# Patient Record
Sex: MALE | NOT HISPANIC OR LATINO | Employment: FULL TIME | URBAN - METROPOLITAN AREA
[De-identification: names, ages, dates, MRNs, and addresses within clinical notes are randomized per-mention and may not be internally consistent; named-entity substitution may affect disease eponyms.]

---

## 2019-11-08 ENCOUNTER — OFFICE VISIT (OUTPATIENT)
Dept: UROLOGY | Facility: CLINIC | Age: 59
End: 2019-11-08
Payer: COMMERCIAL

## 2019-11-08 VITALS
RESPIRATION RATE: 16 BRPM | HEIGHT: 67 IN | BODY MASS INDEX: 34.53 KG/M2 | HEART RATE: 69 BPM | WEIGHT: 220 LBS | SYSTOLIC BLOOD PRESSURE: 102 MMHG | DIASTOLIC BLOOD PRESSURE: 70 MMHG

## 2019-11-08 DIAGNOSIS — N52.9 ERECTILE DYSFUNCTION, UNSPECIFIED ERECTILE DYSFUNCTION TYPE: ICD-10-CM

## 2019-11-08 DIAGNOSIS — R97.20 ELEVATED PSA: Primary | ICD-10-CM

## 2019-11-08 LAB
PROSTATE SPECIFIC ANTIGEN, TOTAL: 5.41 NG/ML (ref 0.1–4)
PSA FREE MFR SERPL: 11 %
PSA FREE SERPL-MCNC: 0.6 NG/ML

## 2019-11-08 PROCEDURE — 36415 COLL VENOUS BLD VENIPUNCTURE: CPT | Mod: S$GLB,,, | Performed by: NURSE PRACTITIONER

## 2019-11-08 PROCEDURE — 36415 PR COLLECTION VENOUS BLOOD,VENIPUNCTURE: ICD-10-PCS | Mod: S$GLB,,, | Performed by: NURSE PRACTITIONER

## 2019-11-08 PROCEDURE — 99204 PR OFFICE/OUTPT VISIT, NEW, LEVL IV, 45-59 MIN: ICD-10-PCS | Mod: 25,S$GLB,, | Performed by: NURSE PRACTITIONER

## 2019-11-08 PROCEDURE — 3008F BODY MASS INDEX DOCD: CPT | Mod: CPTII,S$GLB,, | Performed by: NURSE PRACTITIONER

## 2019-11-08 PROCEDURE — 99204 OFFICE O/P NEW MOD 45 MIN: CPT | Mod: 25,S$GLB,, | Performed by: NURSE PRACTITIONER

## 2019-11-08 PROCEDURE — 3008F PR BODY MASS INDEX (BMI) DOCUMENTED: ICD-10-PCS | Mod: CPTII,S$GLB,, | Performed by: NURSE PRACTITIONER

## 2019-11-08 RX ORDER — TADALAFIL 20 MG/1
TABLET, FILM COATED ORAL
Refills: 3 | COMMUNITY
Start: 2019-10-15

## 2019-11-08 RX ORDER — LISINOPRIL AND HYDROCHLOROTHIAZIDE 10; 12.5 MG/1; MG/1
TABLET ORAL
Refills: 11 | COMMUNITY
Start: 2019-10-15

## 2019-11-08 RX ORDER — ROSUVASTATIN CALCIUM 20 MG/1
TABLET, COATED ORAL
Refills: 0 | COMMUNITY
Start: 2019-09-16

## 2019-11-08 NOTE — PROGRESS NOTES
Chief Complaint: elevated PSA    HPI:  59-year-old  male who presents as a new referral for elevated PSA.  Patient underwent a routine work physical, PSA results showing elevation around 5, so patient was recommended to follow-up with urology.  Patient does not have a copy of these lab results today.  He denies a family history of prostate cancer.  He denies any difficulty with urination such as weak stream, urinary frequency, nocturia, hematuria, or burning with urination.  He is not on any prostate medications.  He denies any recent diagnosis of UTI or recent antibiotic use.     Patient does report a history of erectile dysfunction, maintained on Cialis prescribed by another provider.    Allergies:  Patient has no known allergies.    Medications:  has a current medication list which includes the following prescription(s): cialis, lisinopril-hydrochlorothiazide, and rosuvastatin.    Review of Systems:  General: No fever, chills, vision changes, dizziness, weakness, fatigue, unexplained weight loss, confusion, or mood swings.  Skin: No rashes, itching, or changes in color/texture of skin.  Chest: Denies BONILLA, cyanosis, wheezing, cough, and sputum production.  Abdomen: Appetite fine. Denies diarrhea, abdominal pain, hematemesis, or blood in stool.  Musculoskeletal: No joint stiffness or swelling. Denies back pain.  : As above.  All other review of systems negative.    PMH:   has a past medical history of ED (erectile dysfunction), HTN (hypertension), and Hyperlipemia.    PSH:   has a past surgical history that includes Knee arthroscopy and Rotator cuff repair.    FamHx: family history is not on file.    SocHx:  reports that he has never smoked. He has never used smokeless tobacco. He reports that he drinks alcohol. He reports that he does not use drugs.      Physical Exam:  Vitals:    11/08/19 0952   BP: 102/70   Pulse: 69   Resp: 16     General: AAOx3, no apparent distress, no deformities  Neck:  supple, no masses, normal thyroid  Lungs: normal inspiration, no use of accessory muscles  Heart: regular rate and rhythm, no arrhythmias  Abdomen: soft, NT, ND, no masses, no hernias, no hepatosplenomegaly  Lymphatic: no unusually enlarged or tender lymph nodes  Skin: warm and dry, no jaundice.  Ext: without edema or deformity.  : Normal rectal tone, no hemorrhoids. Prostate soft, smooth surface, no nodules or masses appreciated.   Labs/Studies: none    Impression/Plan:   Elevated PSA  Comments:  PSA from outside facility 5 so will redraw today and if elevated then we will proceed with biopsy  ADRIANA accomplished  Orders:  -     PSA, total and free    Erectile dysfunction, unspecified erectile dysfunction type  Comments:  maintained on Cialis prescribed by another provider        No follow-ups on file. Follow up to be determined after review of PSA results

## 2019-11-11 ENCOUNTER — TELEPHONE (OUTPATIENT)
Dept: UROLOGY | Facility: CLINIC | Age: 59
End: 2019-11-11

## 2019-11-11 DIAGNOSIS — R97.20 ELEVATED PSA: Primary | ICD-10-CM

## 2019-11-11 NOTE — TELEPHONE ENCOUNTER
----- Message from Jaylyn Norton sent at 11/11/2019 11:48 AM CST -----  Contact: Patient  Type:  Patient Returning Call    Who Called:patient  Who Left Message for Patient:Nely Joanne  Does the patient know what this is regarding?:no  Would the patient rather a call back or a response via [facility]? Call back  Best Call Back Number:[phone]  Additional Information:

## 2019-11-11 NOTE — TELEPHONE ENCOUNTER
PSA elevated at 5.41 c 11%free, will proceed with biopsy    Attempted to call patient, left VM to call back

## 2019-11-22 ENCOUNTER — CLINICAL SUPPORT (OUTPATIENT)
Dept: UROLOGY | Facility: CLINIC | Age: 59
End: 2019-11-22
Payer: COMMERCIAL

## 2019-11-22 DIAGNOSIS — R97.20 ELEVATED PSA: Primary | ICD-10-CM

## 2019-12-05 ENCOUNTER — PROCEDURE VISIT (OUTPATIENT)
Dept: UROLOGY | Facility: CLINIC | Age: 59
End: 2019-12-05
Payer: COMMERCIAL

## 2019-12-05 VITALS
WEIGHT: 237 LBS | BODY MASS INDEX: 37.2 KG/M2 | OXYGEN SATURATION: 96 % | HEART RATE: 81 BPM | SYSTOLIC BLOOD PRESSURE: 121 MMHG | DIASTOLIC BLOOD PRESSURE: 75 MMHG | RESPIRATION RATE: 16 BRPM | HEIGHT: 67 IN

## 2019-12-05 DIAGNOSIS — R97.20 ELEVATED PSA: Primary | ICD-10-CM

## 2019-12-05 PROCEDURE — 76872 US TRANSRECTAL: CPT | Mod: S$GLB,,, | Performed by: SPECIALIST

## 2019-12-05 PROCEDURE — 96372 PR INJECTION,THERAP/PROPH/DIAG2ST, IM OR SUBCUT: ICD-10-PCS | Mod: 59,S$GLB,, | Performed by: SPECIALIST

## 2019-12-05 PROCEDURE — 76872 TRUS: ICD-10-PCS | Mod: S$GLB,,, | Performed by: SPECIALIST

## 2019-12-05 PROCEDURE — 55700 TRUS: CPT | Mod: S$GLB,,, | Performed by: SPECIALIST

## 2019-12-05 PROCEDURE — 55700 TRUS: ICD-10-PCS | Mod: S$GLB,,, | Performed by: SPECIALIST

## 2019-12-05 PROCEDURE — 96372 THER/PROPH/DIAG INJ SC/IM: CPT | Mod: 59,S$GLB,, | Performed by: SPECIALIST

## 2019-12-05 RX ORDER — CEFTRIAXONE 1 G/1
1 INJECTION, POWDER, FOR SOLUTION INTRAMUSCULAR; INTRAVENOUS
Status: COMPLETED | OUTPATIENT
Start: 2019-12-05 | End: 2019-12-05

## 2019-12-05 RX ORDER — DIAZEPAM 10 MG/1
10 TABLET ORAL
Status: COMPLETED | OUTPATIENT
Start: 2019-12-05 | End: 2019-12-05

## 2019-12-05 RX ORDER — PROMETHAZINE HYDROCHLORIDE 25 MG/ML
25 INJECTION, SOLUTION INTRAMUSCULAR; INTRAVENOUS
Status: COMPLETED | OUTPATIENT
Start: 2019-12-05 | End: 2019-12-05

## 2019-12-05 RX ORDER — MEPERIDINE HYDROCHLORIDE 25 MG/ML
25 INJECTION INTRAMUSCULAR; INTRAVENOUS; SUBCUTANEOUS
Status: COMPLETED | OUTPATIENT
Start: 2019-12-05 | End: 2019-12-05

## 2019-12-05 RX ADMIN — DIAZEPAM 10 MG: 10 TABLET ORAL at 11:12

## 2019-12-05 RX ADMIN — PROMETHAZINE HYDROCHLORIDE 25 MG: 25 INJECTION, SOLUTION INTRAMUSCULAR; INTRAVENOUS at 11:12

## 2019-12-05 RX ADMIN — CEFTRIAXONE 1 G: 1 INJECTION, POWDER, FOR SOLUTION INTRAMUSCULAR; INTRAVENOUS at 11:12

## 2019-12-05 RX ADMIN — MEPERIDINE HYDROCHLORIDE 25 MG: 25 INJECTION INTRAMUSCULAR; INTRAVENOUS; SUBCUTANEOUS at 11:12

## 2019-12-05 NOTE — LETTER
December 5, 2019    [facility]  [address] [address]  Phone: [phone]  Fax: [phone]         Lily Lubin  [address] [address]        Lily Lubin    Was treated here on 12/05/2019    May Return to work/school on 12/09/2019          Sincerely,    Edvin Lisa MD

## 2019-12-05 NOTE — PROCEDURES
TRUS  Date/Time: 12/5/2019 11:20 AM  Performed by: Edvin Lisa MD  Authorized by: Edvin Lisa MD     Consent Done?:  Yes (Written)  Indications: Elevated PSA    Preparation: Patient was prepped and draped in usual sterile fashion    Position:  Left lateral  Anesthesia:  10cc's 1% Lidocaine  Prostate Size:  Prostate size was calculated 128.55 g  Lesions:: Yes         Type:  Hypoechoic  Left Base Biopsies: 2  Left Mid Biopsies: 2  Left [place] Biopsies: 2  Right Base Biopsies: 2  Right Mid Biopsies: 2  Right [place] Biopsies: 2  Transitional zone: No    Total Biopsies:  12    Patient tolerance:  Patient tolerated the procedure well with no immediate complications     PATIENT NOTED TO HAVE A VERY LARGE GLAND WITH A MEDIAN LOBE THAT IS VERY SIGNIFICANT ON ULTRASOUND.

## 2019-12-05 NOTE — PATIENT INSTRUCTIONS

## 2019-12-12 ENCOUNTER — TELEPHONE (OUTPATIENT)
Dept: UROLOGY | Facility: CLINIC | Age: 59
End: 2019-12-12

## 2019-12-12 NOTE — TELEPHONE ENCOUNTER
Called to inform pt he had bx on 12/5 and will not get results until appt on 12/20. Pt did not answer left voicemail.     ----- Message from Jaylyn Norton sent at 12/12/2019 11:50 AM CST -----  Contact: patient  Type:  Test Results    Who Called: patient  Name of Test (Lab/Mammo/Etc): Lab  Date of Test: 12/5/19  Ordering Provider: Edvin Lisa  Where the test was performed: [facility]  Would the patient rather a call back or a response via [facility]? Call back  Best Call Back Number: [phone]  Additional Information:  Patient states he has not received a call about results and he was seen on 12/5/19. Patient also stated if he does not answer to send a text message

## 2019-12-20 ENCOUNTER — OFFICE VISIT (OUTPATIENT)
Dept: UROLOGY | Facility: CLINIC | Age: 59
End: 2019-12-20
Payer: COMMERCIAL

## 2019-12-20 VITALS
BODY MASS INDEX: 37.2 KG/M2 | SYSTOLIC BLOOD PRESSURE: 126 MMHG | HEART RATE: 72 BPM | DIASTOLIC BLOOD PRESSURE: 88 MMHG | RESPIRATION RATE: 12 BRPM | WEIGHT: 237 LBS | HEIGHT: 67 IN

## 2019-12-20 DIAGNOSIS — R39.9 LOWER URINARY TRACT SYMPTOMS (LUTS): ICD-10-CM

## 2019-12-20 DIAGNOSIS — N13.8 BPH WITH URINARY OBSTRUCTION: ICD-10-CM

## 2019-12-20 DIAGNOSIS — N40.1 BPH WITH URINARY OBSTRUCTION: ICD-10-CM

## 2019-12-20 DIAGNOSIS — C61 PROSTATE CANCER: Primary | ICD-10-CM

## 2019-12-20 LAB
BILIRUB UR QL STRIP: NEGATIVE
GLUCOSE UR QL STRIP: NEGATIVE
KETONES UR QL STRIP: NEGATIVE
LEUKOCYTE ESTERASE UR QL STRIP: NEGATIVE
PH, POC UA: 5.5
POC AMORP, URINE: ABNORMAL
POC BACTI, URINE: ABNORMAL
POC BLOOD, URINE: POSITIVE
POC CASTS, URINE: ABNORMAL
POC CRYST, URINE: ABNORMAL
POC EPITH, URINE: 1
POC HCG, URINE: ABNORMAL
POC HYALIN, URINE: ABNORMAL
POC MUCUS, URINE: ABNORMAL
POC NITRATES, URINE: NEGATIVE
POC OTHER, URINE: ABNORMAL
POC RBC, URINE: ABNORMAL HPF
POC WBC, URINE: ABNORMAL HPF
PROT UR QL STRIP: NEGATIVE
SP GR UR STRIP: 1.01 (ref 1–1.03)
UROBILINOGEN UR STRIP-ACNC: 0.2 (ref 0.3–2.2)

## 2019-12-20 PROCEDURE — 99213 PR OFFICE/OUTPT VISIT, EST, LEVL III, 20-29 MIN: ICD-10-PCS | Mod: S$GLB,,, | Performed by: SPECIALIST

## 2019-12-20 PROCEDURE — 3008F BODY MASS INDEX DOCD: CPT | Mod: CPTII,S$GLB,, | Performed by: SPECIALIST

## 2019-12-20 PROCEDURE — 3008F PR BODY MASS INDEX (BMI) DOCUMENTED: ICD-10-PCS | Mod: CPTII,S$GLB,, | Performed by: SPECIALIST

## 2019-12-20 PROCEDURE — 99213 OFFICE O/P EST LOW 20 MIN: CPT | Mod: S$GLB,,, | Performed by: SPECIALIST

## 2019-12-20 RX ORDER — TAMSULOSIN HYDROCHLORIDE 0.4 MG/1
0.4 CAPSULE ORAL NIGHTLY
Qty: 30 CAPSULE | Refills: 11 | Status: SHIPPED | OUTPATIENT
Start: 2019-12-20 | End: 2020-12-19

## 2019-12-20 RX ORDER — FINASTERIDE 5 MG/1
5 TABLET, FILM COATED ORAL DAILY
Qty: 30 TABLET | Refills: 11 | Status: SHIPPED | OUTPATIENT
Start: 2019-12-20 | End: 2020-12-19

## 2019-12-20 NOTE — PROGRESS NOTES
Subjective:       Patient ID: Lily Lubin is a 59 y.o. male.    Chief Complaint: Other (pus bx fu)      HPI:  59-year-old man presented me for elevated PSA.  Recommended needle biopsy which was done 2 weeks ago.  He is here for results to.    We did review the results and he has a single core positive [place] 3 + 3 equal 6 1 mm focus 7% of the core at the left lateral base.  PSA that prompted this biopsy was 5.41 ng/mL.  Transrectal ultrasound of the prostate showed a volume of 128.55 g of prostate tissue.  So clearly has a very large prostate    He is reporting lot of prostatic symptoms and he would like to go on any medication.  He is about to get  and interested in having kids.  But that he also needs to be on something like Proscar to help shrink his prostate.  But we talked about the side effects for both Proscar and Flomax.    Past Medical History:   Past Medical History:   Diagnosis Date    ED (erectile dysfunction)     Elevated PSA     HTN (hypertension)     Hyperlipemia        Past Surgical Historical:   Past Surgical History:   Procedure Laterality Date    KNEE ARTHROSCOPY      ROTATOR CUFF REPAIR          Medications:   Medication List with Changes/Refills   Current Medications    CIALIS 20 MG TAB    TK 1 T PO 1 HOUR PRIOR TO SEX PRF 30 DAYS    LISINOPRIL-HYDROCHLOROTHIAZIDE (PRINZIDE,ZESTORETIC) 10-12.5 MG PER TABLET    TK 1 T PO D UTD FOR BP.    ROSUVASTATIN (CRESTOR) 20 MG TABLET    TK 1 T PO QHS        Past Social History:   Social History     Socioeconomic History    Marital status: Single     Spouse name: Not on file    Number of children: Not on file    Years of education: Not on file    Highest education level: Not on file   Occupational History    Not on file   Social Needs    Financial resource strain: Not on file    Food insecurity:     Worry: Not on file     Inability: Not on file    Transportation needs:     Medical: Not on file     Non-medical: Not on file   Tobacco Use     Smoking status: Never Smoker    Smokeless tobacco: Never Used   Substance and Sexual Activity    Alcohol use: Yes     Frequency: Never     Comment: occasional    Drug use: Never    Sexual activity: Yes   Lifestyle    Physical activity:     Days per week: Not on file     Minutes per session: Not on file    Stress: Not on file   Relationships    Social connections:     Talks on phone: Not on file     Gets together: Not on file     Attends [employer]ist service: Not on file     Active member of club or organization: Not on file     Attends meetings of clubs or organizations: Not on file     Relationship status: Not on file   Other Topics Concern    Not on file   Social History Narrative    Not on file       Allergies: Review of patient's allergies indicates:  No Known Allergies     Family History: History reviewed. No pertinent family history.     Review of Systems:   systems reviewed and notable for prostate cancer and symptomatic BPH  All other systems were reviewed Neg except as stated in the HPI    Physical Exam:  AGeneral: A&Ox3. No apparent distress. No deformities.  Neck: No masses. Normal thyroid.  Lungs: normal inspiration. No use of accessory muscles.  Heart: normal pulse. No arrhythmias.  Abdomen: Soft. NT. ND. No masses. No hernias. No hepatosplenomegaly.  Lymphatic: Neck and groin nodes negative.  Skin: The skin is warm and dry. No jaundice.  Neurology: Cranial nerves 2-12 crossly intact, no focal weaknesses, no sensation deficits, no motor deficits  Ext: No clubbing, cyanosis or edema.  :  Deferred      Assessment/Plan:       59-year-old man with a severely enlarged prostate who has symptomatic BPH and he has low volume low risk T1c adenocarcinoma of the prostate.    We talked about the natural progression of prostate cancer we talked about the risk stratification.  He has low risk disease.  We recommended active surveillance his past patient still has a very active sexual life and is about  to get  and spine have kids.    We will start him on Proscar and Flomax with see how he does with the Proscar if is affecting his erections then we are going to stop it if not will leave him on it    Return to clinic in 6 months for PSA check.    Problem List Items Addressed This Visit        Renal/    BPH with urinary obstruction    Lower urinary tract symptoms (LUTS)       Oncology    Prostate cancer - Primary

## 2020-06-22 ENCOUNTER — OFFICE VISIT (OUTPATIENT)
Dept: UROLOGY | Facility: CLINIC | Age: 60
End: 2020-06-22
Payer: COMMERCIAL

## 2020-06-22 VITALS
WEIGHT: 230 LBS | SYSTOLIC BLOOD PRESSURE: 117 MMHG | RESPIRATION RATE: 18 BRPM | HEIGHT: 67 IN | BODY MASS INDEX: 36.1 KG/M2 | DIASTOLIC BLOOD PRESSURE: 78 MMHG | HEART RATE: 72 BPM

## 2020-06-22 DIAGNOSIS — C61 PROSTATE CANCER: Primary | ICD-10-CM

## 2020-06-22 DIAGNOSIS — N40.1 BPH WITH URINARY OBSTRUCTION: ICD-10-CM

## 2020-06-22 DIAGNOSIS — N13.8 BPH WITH URINARY OBSTRUCTION: ICD-10-CM

## 2020-06-22 PROCEDURE — 3008F PR BODY MASS INDEX (BMI) DOCUMENTED: ICD-10-PCS | Mod: CPTII,S$GLB,, | Performed by: SPECIALIST

## 2020-06-22 PROCEDURE — 99213 PR OFFICE/OUTPT VISIT, EST, LEVL III, 20-29 MIN: ICD-10-PCS | Mod: S$GLB,,, | Performed by: SPECIALIST

## 2020-06-22 PROCEDURE — 3008F BODY MASS INDEX DOCD: CPT | Mod: CPTII,S$GLB,, | Performed by: SPECIALIST

## 2020-06-22 PROCEDURE — 99213 OFFICE O/P EST LOW 20 MIN: CPT | Mod: S$GLB,,, | Performed by: SPECIALIST

## 2020-06-22 RX ORDER — FINASTERIDE 5 MG/1
5 TABLET, FILM COATED ORAL DAILY
Qty: 30 TABLET | Refills: 11 | Status: SHIPPED | OUTPATIENT
Start: 2020-06-22 | End: 2020-12-28 | Stop reason: SDUPTHER

## 2020-06-22 RX ORDER — TAMSULOSIN HYDROCHLORIDE 0.4 MG/1
0.4 CAPSULE ORAL DAILY
Qty: 30 CAPSULE | Refills: 11 | Status: SHIPPED | OUTPATIENT
Start: 2020-06-22 | End: 2020-12-28 | Stop reason: SDUPTHER

## 2020-06-22 NOTE — PROGRESS NOTES
Subjective:       Patient ID: Lily Lubin is a 60 y.o. male.    Chief Complaint: Follow-up (6mth f/u)      HPI: 60-year-old man with a severely enlarged prostate who has symptomatic BPH and he has low volume low risk T1c adenocarcinoma of the prostate.  He is currently on active surveillance.  He is here today for six-month follow-up for PSA check.    During the prostate biopsy he was found to have a prostate volume of 128.55 g.  His PSA that prompted the biopsy was 5.4 ng/mL.  He had a single core positive [place] 3+3=6 in 1 mm focus with 7% of the core involved.     Following the decision to place him on active surveillance we decided start him on a combination of Proscar and Flomax.  Patient took the medications and then did not actual refill.  Today he is reporting persistent lower tract symptoms.     Past Medical History:   Past Medical History:   Diagnosis Date    ED (erectile dysfunction)     Elevated PSA     HTN (hypertension)     Hyperlipemia        Past Surgical Historical:   Past Surgical History:   Procedure Laterality Date    KNEE ARTHROSCOPY      ROTATOR CUFF REPAIR          Medications:   Medication List with Changes/Refills   Current Medications    CIALIS 20 MG TAB    TK 1 T PO 1 HOUR PRIOR TO SEX PRF 30 DAYS    FINASTERIDE (PROSCAR) 5 MG TABLET    Take 1 tablet (5 mg total) by mouth once daily.    LISINOPRIL-HYDROCHLOROTHIAZIDE (PRINZIDE,ZESTORETIC) 10-12.5 MG PER TABLET    TK 1 T PO D UTD FOR BP.    ROSUVASTATIN (CRESTOR) 20 MG TABLET    TK 1 T PO QHS    TAMSULOSIN (FLOMAX) 0.4 MG CAP    Take 1 capsule (0.4 mg total) by mouth nightly.        Past Social History:   Social History     Socioeconomic History    Marital status: Single     Spouse name: Not on file    Number of children: Not on file    Years of education: Not on file    Highest education level: Not on file   Occupational History    Not on file   Social Needs    Financial resource strain: Not on file    Food insecurity      Worry: Not on file     Inability: Not on file    Transportation needs     Medical: Not on file     Non-medical: Not on file   Tobacco Use    Smoking status: Never Smoker    Smokeless tobacco: Never Used   Substance and Sexual Activity    Alcohol use: Yes     Frequency: Never     Comment: occasional    Drug use: Never    Sexual activity: Yes   Lifestyle    Physical activity     Days per week: Not on file     Minutes per session: Not on file    Stress: Not on file   Relationships    Social connections     Talks on phone: Not on file     Gets together: Not on file     Attends Caodaism service: Not on file     Active member of club or organization: Not on file     Attends meetings of clubs or organizations: Not on file     Relationship status: Not on file   Other Topics Concern    Not on file   Social History Narrative    Not on file       Allergies: Review of patient's allergies indicates:  No Known Allergies     Family History: History reviewed. No pertinent family history.     Review of Systems:   systems reviewed and notable for prostate cancer  All other systems were reviewed Neg except as stated in the HPI    Physical Exam:  General: A&Ox3. No apparent distress. No deformities.  Neck: No masses. Normal thyroid.  Lungs: normal inspiration. No use of accessory muscles.  Heart: normal pulse. No arrhythmias.  Abdomen: Soft. NT. ND. No masses. No hernias. No hepatosplenomegaly.  Lymphatic: Neck and groin nodes negative.  Skin: The skin is warm and dry. No jaundice.  Neurology: Cranial nerves 2-12 crossly intact, no focal weaknesses, no sensation deficits, no motor deficits  Ext: No clubbing, cyanosis or edema.  :  Deferred      Assessment/Plan:       60-year-old man originally from [place] who has low risk prostate cancer currently on active surveillance.    1.  He has symptomatic BPH and lower tract symptoms.  I am going to renew his prescriptions.  Restart him on Flomax as well as Proscar  2.  Obtain  blood for serum PSA today give him a call with the results  3.  Return to clinic in 6 months.  Should be planning for a repeat biopsy sometime in December 2020    Problem List Items Addressed This Visit        Renal/    BPH with urinary obstruction       Oncology    Prostate cancer - Primary    Relevant Orders    Prostate Specific Antigen, Diagnostic

## 2020-06-23 LAB — PSA, DIAGNOSTIC: 4.77 NG/ML (ref 0–4)

## 2020-06-25 ENCOUNTER — TELEPHONE (OUTPATIENT)
Dept: UROLOGY | Facility: CLINIC | Age: 60
End: 2020-06-25

## 2020-06-25 NOTE — TELEPHONE ENCOUNTER
Pt contacted and informed of PSA results and to follow up in 12/2020. Pt verbalized understanding.       ----- Message from Edvin Lisa MD sent at 6/24/2020  8:45 PM CDT -----  Call him with PSA results.  He has known prostate cancer.  PSA that prompted the prostate biopsy was 5.41 ng/mL.  Current PSA is 4.77 ng/mL.  He should have a 6 month follow-up appointment towards the end of the year we should repeat a biopsy to was end of the year.

## 2020-12-28 ENCOUNTER — OFFICE VISIT (OUTPATIENT)
Dept: UROLOGY | Facility: CLINIC | Age: 60
End: 2020-12-28
Payer: COMMERCIAL

## 2020-12-28 VITALS
HEIGHT: 67 IN | WEIGHT: 200 LBS | BODY MASS INDEX: 31.39 KG/M2 | RESPIRATION RATE: 18 BRPM | DIASTOLIC BLOOD PRESSURE: 79 MMHG | SYSTOLIC BLOOD PRESSURE: 119 MMHG | HEART RATE: 77 BPM

## 2020-12-28 DIAGNOSIS — R39.9 LOWER URINARY TRACT SYMPTOMS (LUTS): ICD-10-CM

## 2020-12-28 DIAGNOSIS — C61 PROSTATE CANCER: Primary | ICD-10-CM

## 2020-12-28 LAB — POC RESIDUAL URINE VOLUME: 26 ML (ref 0–100)

## 2020-12-28 PROCEDURE — 1126F PR PAIN SEVERITY QUANTIFIED, NO PAIN PRESENT: ICD-10-PCS | Mod: S$GLB,,, | Performed by: NURSE PRACTITIONER

## 2020-12-28 PROCEDURE — 51798 US URINE CAPACITY MEASURE: CPT | Mod: S$GLB,,, | Performed by: NURSE PRACTITIONER

## 2020-12-28 PROCEDURE — 51798 POCT BLADDER SCAN: ICD-10-PCS | Mod: S$GLB,,, | Performed by: NURSE PRACTITIONER

## 2020-12-28 PROCEDURE — 1126F AMNT PAIN NOTED NONE PRSNT: CPT | Mod: S$GLB,,, | Performed by: NURSE PRACTITIONER

## 2020-12-28 PROCEDURE — 99214 OFFICE O/P EST MOD 30 MIN: CPT | Mod: S$GLB,,, | Performed by: NURSE PRACTITIONER

## 2020-12-28 PROCEDURE — 3008F PR BODY MASS INDEX (BMI) DOCUMENTED: ICD-10-PCS | Mod: CPTII,S$GLB,, | Performed by: NURSE PRACTITIONER

## 2020-12-28 PROCEDURE — 3008F BODY MASS INDEX DOCD: CPT | Mod: CPTII,S$GLB,, | Performed by: NURSE PRACTITIONER

## 2020-12-28 PROCEDURE — 99214 PR OFFICE/OUTPT VISIT, EST, LEVL IV, 30-39 MIN: ICD-10-PCS | Mod: S$GLB,,, | Performed by: NURSE PRACTITIONER

## 2020-12-28 RX ORDER — TAMSULOSIN HYDROCHLORIDE 0.4 MG/1
0.8 CAPSULE ORAL DAILY
Qty: 60 CAPSULE | Refills: 11 | Status: SHIPPED | OUTPATIENT
Start: 2020-12-28 | End: 2021-12-28

## 2020-12-28 RX ORDER — FINASTERIDE 5 MG/1
5 TABLET, FILM COATED ORAL DAILY
Qty: 30 TABLET | Refills: 11 | Status: SHIPPED | OUTPATIENT
Start: 2020-12-28 | End: 2022-03-22

## 2020-12-28 RX ORDER — FINASTERIDE 5 MG/1
5 TABLET, FILM COATED ORAL DAILY
Qty: 30 TABLET | Refills: 11 | Status: SHIPPED | OUTPATIENT
Start: 2020-12-28 | End: 2020-12-28 | Stop reason: CLARIF

## 2020-12-28 RX ORDER — TAMSULOSIN HYDROCHLORIDE 0.4 MG/1
0.8 CAPSULE ORAL DAILY
Qty: 60 CAPSULE | Refills: 11 | Status: SHIPPED | OUTPATIENT
Start: 2020-12-28 | End: 2020-12-28 | Stop reason: CLARIF

## 2020-12-28 NOTE — PROGRESS NOTES
Patient seen and examined.  Clinical data reviewed.  Case discussed with the nurse practitioner.  I agree with his assessment and plan.    Briefly 60-year-old man with a severely enlarged prostate.  He has prostate cancer low risk low volume on active surveillance.  He continues to experience bothersome lower tract symptoms but he quit taking his medications.  The plan will be to resume the tamsulosin and finasteride combination, increase the tamsulosin to 2 tablets at bedtime, obtain blood for serum PSA and return to clinic in 6 months.

## 2020-12-28 NOTE — ASSESSMENT & PLAN NOTE
Patient stopped Flomax and Proscar due to no improvement.   Patient is candidate for TURP but patient is planning to start a family.   Will restart Flomax and increase to 2 a day.  Will restart Proscar 5 mg daily.

## 2020-12-28 NOTE — PROGRESS NOTES
Subjective:       Patient ID: Lily Lubin is a 60 y.o. male.    Chief Complaint: Follow-up (6 mos f/u bph ), Urinary Frequency, and Other (painful urination and and burning)      HPI: 60-year-old male, patient Dr. Lisa, presents for 6 month follow-up.  Patient has a history of prostate cancer.  Patient underwent a biopsy 12/05/2019 due to a PSA of 5.41.  One cells samples was positive for [place] 3+3=6.  Sample size was on mm involving 7% the tissue.  It was located to the left lateral base.  Last PSA 06/22/2020 was 4.77.    Patient has a history of difficulty voiding with lower urinary tract symptoms.  Patient was on Flomax 1 a day and Proscar 5 mg daily.  Patient states he is no longer on that medication.  Patient states he had no change in symptoms while on the medication.  Patient states he has urinary frequency, nocturia, difficulty urinating.  Patient states urine is slow to drain.    Patient denies any blood in urine.  Denies any flank pain.  Denies any fever.  Denies any significant weight loss.  No other urinary complaints.  All health problems appear stable at this time.       Past Medical History:   Past Medical History:   Diagnosis Date    ED (erectile dysfunction)     Elevated PSA     HTN (hypertension)     Hyperlipemia        Past Surgical Historical:   Past Surgical History:   Procedure Laterality Date    KNEE ARTHROSCOPY      ROTATOR CUFF REPAIR          Medications:   Medication List with Changes/Refills   Current Medications    CIALIS 20 MG TAB    TK 1 T PO 1 HOUR PRIOR TO SEX PRF 30 DAYS    LISINOPRIL-HYDROCHLOROTHIAZIDE (PRINZIDE,ZESTORETIC) 10-12.5 MG PER TABLET    TK 1 T PO D UTD FOR BP.    ROSUVASTATIN (CRESTOR) 20 MG TABLET    TK 1 T PO QHS   Changed and/or Refilled Medications    Modified Medication Previous Medication    FINASTERIDE (PROSCAR) 5 MG TABLET finasteride (PROSCAR) 5 mg tablet       Take 1 tablet (5 mg total) by mouth once daily.    Take 1 tablet (5 mg total) by mouth  once daily.    TAMSULOSIN (FLOMAX) 0.4 MG CAP tamsulosin (FLOMAX) 0.4 mg Cap       Take 2 capsules (0.8 mg total) by mouth once daily.    Take 1 capsule (0.4 mg total) by mouth once daily.   Discontinued Medications    FINASTERIDE (PROSCAR) 5 MG TABLET    Take 1 tablet (5 mg total) by mouth once daily.    TAMSULOSIN (FLOMAX) 0.4 MG CAP    Take 1 capsule (0.4 mg total) by mouth nightly.        Past Social History:   Social History     Socioeconomic History    Marital status: Single     Spouse name: Not on file    Number of children: Not on file    Years of education: Not on file    Highest education level: Not on file   Occupational History    Not on file   Social Needs    Financial resource strain: Not on file    Food insecurity     Worry: Not on file     Inability: Not on file    Transportation needs     Medical: Not on file     Non-medical: Not on file   Tobacco Use    Smoking status: Never Smoker    Smokeless tobacco: Never Used   Substance and Sexual Activity    Alcohol use: Yes     Frequency: Never     Comment: occasional    Drug use: Never    Sexual activity: Yes   Lifestyle    Physical activity     Days per week: Not on file     Minutes per session: Not on file    Stress: Not on file   Relationships    Social connections     Talks on phone: Not on file     Gets together: Not on file     Attends Anabaptism service: Not on file     Active member of club or organization: Not on file     Attends meetings of clubs or organizations: Not on file     Relationship status: Not on file   Other Topics Concern    Not on file   Social History Narrative    Not on file       Allergies: Review of patient's allergies indicates:  No Known Allergies     Family History: History reviewed. No pertinent family history.     Review of Systems:  Review of Systems   Constitutional: Negative for activity change and appetite change.   HENT: Negative for congestion and dental problem.    Eyes: Negative for visual  disturbance.   Respiratory: Negative for chest tightness and shortness of breath.    Cardiovascular: Negative for chest pain.   Gastrointestinal: Negative for abdominal distention and abdominal pain.   Genitourinary: Negative for decreased urine volume, difficulty urinating, discharge, dysuria, enuresis, flank pain, frequency, genital sores, hematuria, penile pain, penile swelling, scrotal swelling, testicular pain and urgency.   Musculoskeletal: Negative for back pain and neck pain.   Skin: Negative for color change.   Neurological: Negative for dizziness.   Hematological: Negative for adenopathy.   Psychiatric/Behavioral: Negative for agitation, behavioral problems and confusion.       Physical Exam:  Physical Exam  Vitals signs and nursing note reviewed.   Constitutional:       Appearance: He is well-developed.   HENT:      Head: Normocephalic.   Eyes:      Pupils: Pupils are equal, round, and reactive to light.   Neck:      Musculoskeletal: Normal range of motion and neck supple.   Cardiovascular:      Rate and Rhythm: Normal rate and regular rhythm.      Heart sounds: Normal heart sounds.   Pulmonary:      Effort: Pulmonary effort is normal.      Breath sounds: Normal breath sounds.   Abdominal:      General: Bowel sounds are normal.      Palpations: Abdomen is soft.   Musculoskeletal: Normal range of motion.   Skin:     General: Skin is warm and dry.   Neurological:      Mental Status: He is alert and oriented to person, place, and time.   Psychiatric:         Behavior: Behavior normal.       Urinalysis:  Small blood, red blood cell 6-10.  Bladder scan:  26 cc    Assessment/Plan:   1.  Prostate cancer:  Will check the patient's PSA.  We will notify him of the results.    2.  Lower urinary tract symptoms:  The patient is a candidate for a TURP, however patient is wanting to start a family soon.  Therefore will restart patient on Flomax but increased to 2 a day.  Will also restart the patient on Proscar 5 mg  daily.    Plan follow-up in 6 months, sooner if needed.    Problem List Items Addressed This Visit        Renal/    Lower urinary tract symptoms (LUTS)    Current Assessment & Plan     Patient stopped Flomax and Proscar due to no improvement.   Patient is candidate for TURP but patient is planning to start a family.   Will restart Flomax and increase to 2 a day.  Will restart Proscar 5 mg daily.          Relevant Medications    tamsulosin (FLOMAX) 0.4 mg Cap    finasteride (PROSCAR) 5 mg tablet    Other Relevant Orders    POCT Urinalysis (w/Micro Option)    POCT Bladder Scan       Oncology    Prostate cancer - Primary    Overview     Prostate Cancer on observation.    Biopsy 12/05/2019 due to PSA of 5.41.  1/12 samples [place] 3+3=6, 1 mm involving 7% located in left lateral base.    Ultra sound show prostate size 128.55 gram         Current Assessment & Plan     Last PSA 06/22/2020 was 4.77.  Will check PSA and notify patient of the results.          Relevant Orders    Prostate Specific Antigen, Diagnostic

## 2020-12-30 LAB — PSA, DIAGNOSTIC: 6.16 NG/ML (ref 0–4)

## 2020-12-31 ENCOUNTER — TELEPHONE (OUTPATIENT)
Dept: UROLOGY | Facility: CLINIC | Age: 60
End: 2020-12-31

## 2020-12-31 NOTE — TELEPHONE ENCOUNTER
Contacted pt advised of results, pt verbalized understanding. BJP    ----- Message from Edvin Lisa MD sent at 12/30/2020  5:31 PM CST -----  Call him with his PSA results

## 2021-05-07 DIAGNOSIS — N46.9 MALE INFERTILITY: Primary | ICD-10-CM

## 2021-07-01 ENCOUNTER — OFFICE VISIT (OUTPATIENT)
Dept: UROLOGY | Facility: CLINIC | Age: 61
End: 2021-07-01
Payer: COMMERCIAL

## 2021-07-01 VITALS — HEIGHT: 67 IN | WEIGHT: 200 LBS | BODY MASS INDEX: 31.39 KG/M2 | RESPIRATION RATE: 18 BRPM

## 2021-07-01 DIAGNOSIS — C61 PROSTATE CANCER: ICD-10-CM

## 2021-07-01 DIAGNOSIS — R39.9 LOWER URINARY TRACT SYMPTOMS (LUTS): Primary | ICD-10-CM

## 2021-07-01 LAB
POC RESIDUAL URINE VOLUME: 0 ML (ref 0–100)
PSA, DIAGNOSTIC: 4.47 NG/ML (ref 0–4)
TESTOST SERPL-MCNC: 430 NG/DL (ref 193–740)

## 2021-07-01 PROCEDURE — 99214 OFFICE O/P EST MOD 30 MIN: CPT | Mod: S$GLB,,, | Performed by: NURSE PRACTITIONER

## 2021-07-01 PROCEDURE — 99214 PR OFFICE/OUTPT VISIT, EST, LEVL IV, 30-39 MIN: ICD-10-PCS | Mod: S$GLB,,, | Performed by: NURSE PRACTITIONER

## 2021-07-01 PROCEDURE — 3008F PR BODY MASS INDEX (BMI) DOCUMENTED: ICD-10-PCS | Mod: CPTII,S$GLB,, | Performed by: NURSE PRACTITIONER

## 2021-07-01 PROCEDURE — 51798 US URINE CAPACITY MEASURE: CPT | Mod: S$GLB,,, | Performed by: NURSE PRACTITIONER

## 2021-07-01 PROCEDURE — 51798 POCT BLADDER SCAN: ICD-10-PCS | Mod: S$GLB,,, | Performed by: NURSE PRACTITIONER

## 2021-07-01 PROCEDURE — 3008F BODY MASS INDEX DOCD: CPT | Mod: CPTII,S$GLB,, | Performed by: NURSE PRACTITIONER

## 2021-07-02 ENCOUNTER — TELEPHONE (OUTPATIENT)
Dept: UROLOGY | Facility: CLINIC | Age: 61
End: 2021-07-02

## 2021-07-08 ENCOUNTER — OFFICE VISIT (OUTPATIENT)
Dept: UROLOGY | Facility: CLINIC | Age: 61
End: 2021-07-08
Payer: COMMERCIAL

## 2021-07-08 VITALS
HEIGHT: 67 IN | DIASTOLIC BLOOD PRESSURE: 77 MMHG | HEART RATE: 71 BPM | WEIGHT: 200 LBS | SYSTOLIC BLOOD PRESSURE: 129 MMHG | BODY MASS INDEX: 31.39 KG/M2

## 2021-07-08 DIAGNOSIS — C61 PROSTATE CANCER: Primary | ICD-10-CM

## 2021-07-08 PROCEDURE — 3008F BODY MASS INDEX DOCD: CPT | Mod: CPTII,S$GLB,, | Performed by: UROLOGY

## 2021-07-08 PROCEDURE — 99213 PR OFFICE/OUTPT VISIT, EST, LEVL III, 20-29 MIN: ICD-10-PCS | Mod: S$GLB,,, | Performed by: UROLOGY

## 2021-07-08 PROCEDURE — 99213 OFFICE O/P EST LOW 20 MIN: CPT | Mod: S$GLB,,, | Performed by: UROLOGY

## 2021-07-08 PROCEDURE — 3008F PR BODY MASS INDEX (BMI) DOCUMENTED: ICD-10-PCS | Mod: CPTII,S$GLB,, | Performed by: UROLOGY

## 2021-07-08 RX ORDER — CIPROFLOXACIN 500 MG/1
500 TABLET ORAL 2 TIMES DAILY
Qty: 8 TABLET | Refills: 0 | Status: SHIPPED | OUTPATIENT
Start: 2021-07-08 | End: 2021-07-12

## 2021-07-08 RX ORDER — VALSARTAN AND HYDROCHLOROTHIAZIDE 80; 12.5 MG/1; MG/1
1 TABLET, FILM COATED ORAL DAILY
COMMUNITY
Start: 2021-06-22

## 2021-08-02 ENCOUNTER — TELEPHONE (OUTPATIENT)
Dept: UROLOGY | Facility: CLINIC | Age: 61
End: 2021-08-02

## 2021-08-03 ENCOUNTER — PROCEDURE VISIT (OUTPATIENT)
Dept: UROLOGY | Facility: CLINIC | Age: 61
End: 2021-08-03
Payer: COMMERCIAL

## 2021-08-03 VITALS — WEIGHT: 237.31 LBS | BODY MASS INDEX: 37.25 KG/M2 | HEIGHT: 67 IN

## 2021-08-03 DIAGNOSIS — C61 PROSTATE CANCER: ICD-10-CM

## 2021-08-03 PROCEDURE — 76872 TRANSRECTAL ULTRASOUND W/ BIOPSY: ICD-10-PCS | Mod: S$GLB,,, | Performed by: UROLOGY

## 2021-08-03 PROCEDURE — 55700 TRANSRECTAL ULTRASOUND W/ BIOPSY: ICD-10-PCS | Mod: S$GLB,,, | Performed by: UROLOGY

## 2021-08-03 PROCEDURE — 55700 TRANSRECTAL ULTRASOUND W/ BIOPSY: CPT | Mod: S$GLB,,, | Performed by: UROLOGY

## 2021-08-03 PROCEDURE — 76872 US TRANSRECTAL: CPT | Mod: S$GLB,,, | Performed by: UROLOGY

## 2022-03-22 ENCOUNTER — TELEPHONE (OUTPATIENT)
Dept: UROLOGY | Facility: CLINIC | Age: 62
End: 2022-03-22
Payer: COMMERCIAL

## 2022-03-22 DIAGNOSIS — R39.9 LOWER URINARY TRACT SYMPTOMS (LUTS): ICD-10-CM

## 2022-03-22 RX ORDER — FINASTERIDE 5 MG/1
TABLET, FILM COATED ORAL
Qty: 90 TABLET | Refills: 1 | Status: SHIPPED | OUTPATIENT
Start: 2022-03-22

## 2022-03-22 NOTE — TELEPHONE ENCOUNTER
Contacted pt, appt scheduled. BJP    ----- Message from Dina Jang MA sent at 3/22/2022  2:47 PM CDT -----  Contact: self    ----- Message -----  From: Gina Carrion  Sent: 3/22/2022   2:23 PM CDT  To: [facility] Staff    Type:  Patient Returning Call    Who Called: Lily Lubin   Who Left Message for Patient: Christina  Does the patient know what this is regarding?: Scheduling  Would the patient rather a call back or a response via [facility]?  Call back   Best Call Back Number: [phone]   Additional Information: n/a

## 2022-03-31 ENCOUNTER — CLINICAL SUPPORT (OUTPATIENT)
Dept: UROLOGY | Facility: CLINIC | Age: 62
End: 2022-03-31
Payer: COMMERCIAL

## 2022-03-31 DIAGNOSIS — C61 PROSTATE CANCER: Primary | ICD-10-CM

## 2022-04-01 LAB — PSA, DIAGNOSTIC: 4.32 NG/ML (ref 0–4)

## 2022-04-04 ENCOUNTER — TELEPHONE (OUTPATIENT)
Dept: UROLOGY | Facility: CLINIC | Age: 62
End: 2022-04-04
Payer: COMMERCIAL

## 2022-04-04 NOTE — TELEPHONE ENCOUNTER
Attempted to contact pt regarding kelli date and time for pt to see Dr. Guajardo. Left VM.    ----- Message from Skyler Guajardo MD sent at 4/4/2022  8:26 AM CDT -----  Please contact pt and inform him of upcoming appointment.